# Patient Record
Sex: FEMALE | Race: WHITE | NOT HISPANIC OR LATINO | URBAN - NONMETROPOLITAN AREA
[De-identification: names, ages, dates, MRNs, and addresses within clinical notes are randomized per-mention and may not be internally consistent; named-entity substitution may affect disease eponyms.]

---

## 2020-11-20 ENCOUNTER — IMPORTED ENCOUNTER (OUTPATIENT)
Dept: URBAN - NONMETROPOLITAN AREA CLINIC 1 | Facility: CLINIC | Age: 55
End: 2020-11-20

## 2020-11-20 PROBLEM — H25.13: Noted: 2020-11-20

## 2020-11-20 PROBLEM — H52.12: Noted: 2021-01-02

## 2020-11-20 PROBLEM — H52.4: Noted: 2021-01-02

## 2020-11-20 PROBLEM — H52.221: Noted: 2021-01-02

## 2020-11-20 PROCEDURE — 99213 OFFICE O/P EST LOW 20 MIN: CPT

## 2020-11-20 NOTE — PATIENT DISCUSSION
Cataracts OU-  discussed findings w/patient-  no treatment indicated at this time-  UV protection recommended-  continue to monitor yearly or prnSimple Astigmatism OD/Simple Myopia OS w/Presbyopia-  discussed findings w/patient-  new spectacle Rx issued-  continue to monitor yearly or prn; 's Notes: MR 11/20/2020DFE 11/20/2020

## 2022-04-10 ASSESSMENT — VISUAL ACUITY
OD_SC: 20/25
OS_SC: 20/25
OU_SC: 20/25
OU_CC: J1+

## 2022-04-10 ASSESSMENT — TONOMETRY
OS_IOP_MMHG: 15
OD_IOP_MMHG: 15

## 2024-01-24 ENCOUNTER — COMPREHENSIVE EXAM (OUTPATIENT)
Dept: URBAN - NONMETROPOLITAN AREA CLINIC 4 | Facility: CLINIC | Age: 59
End: 2024-01-24

## 2024-01-24 DIAGNOSIS — H25.13: ICD-10-CM

## 2024-01-24 DIAGNOSIS — H52.4: ICD-10-CM

## 2024-01-24 DIAGNOSIS — H52.221: ICD-10-CM

## 2024-01-24 DIAGNOSIS — H52.12: ICD-10-CM

## 2024-01-24 PROCEDURE — 92015 DETERMINE REFRACTIVE STATE: CPT

## 2024-01-24 PROCEDURE — 92014 COMPRE OPH EXAM EST PT 1/>: CPT

## 2024-01-24 ASSESSMENT — TONOMETRY
OD_IOP_MMHG: 16
OS_IOP_MMHG: 16

## 2024-01-24 ASSESSMENT — VISUAL ACUITY
OS_SC: 20/20
OD_SC: 20/30-1